# Patient Record
Sex: FEMALE | Race: WHITE | NOT HISPANIC OR LATINO | Employment: OTHER | RURAL
[De-identification: names, ages, dates, MRNs, and addresses within clinical notes are randomized per-mention and may not be internally consistent; named-entity substitution may affect disease eponyms.]

---

## 2020-09-30 ENCOUNTER — HISTORICAL (OUTPATIENT)
Dept: ADMINISTRATIVE | Facility: HOSPITAL | Age: 75
End: 2020-09-30

## 2021-03-31 ENCOUNTER — HOSPITAL ENCOUNTER (OUTPATIENT)
Dept: RADIOLOGY | Facility: HOSPITAL | Age: 76
Discharge: HOME | End: 2021-03-31
Payer: MEDICARE

## 2021-03-31 VITALS — WEIGHT: 168 LBS | BODY MASS INDEX: 29.77 KG/M2 | HEIGHT: 63 IN

## 2021-03-31 DIAGNOSIS — Z09 FOLLOW-UP EXAM, 3-6 MONTHS SINCE PREVIOUS EXAM: ICD-10-CM

## 2021-03-31 DIAGNOSIS — Z85.3 HX OF BREAST CANCER: ICD-10-CM

## 2021-03-31 PROCEDURE — 77066 DX MAMMO INCL CAD BI: CPT | Mod: 26,,, | Performed by: RADIOLOGY

## 2021-03-31 PROCEDURE — 77066 DX MAMMO INCL CAD BI: CPT | Mod: TC

## 2021-03-31 PROCEDURE — 77066 MAMMO DIGITAL DIAGNOSTIC BILAT: ICD-10-PCS | Mod: 26,,, | Performed by: RADIOLOGY

## 2021-03-31 PROCEDURE — 77065 DX MAMMO INCL CAD UNI: CPT | Mod: TC,RT

## 2021-12-15 ENCOUNTER — HOSPITAL ENCOUNTER (OUTPATIENT)
Dept: RADIOLOGY | Facility: HOSPITAL | Age: 76
Discharge: HOME | End: 2021-12-15
Attending: RADIOLOGY
Payer: MEDICARE

## 2021-12-15 DIAGNOSIS — Z85.3 HX OF BREAST CANCER: ICD-10-CM

## 2021-12-15 PROCEDURE — 77065 DX MAMMO INCL CAD UNI: CPT | Mod: 26,RT,, | Performed by: RADIOLOGY

## 2021-12-15 PROCEDURE — 77065 MAMMO DIGITAL DIAGNOSTIC RIGHT: ICD-10-PCS | Mod: 26,RT,, | Performed by: RADIOLOGY

## 2021-12-15 PROCEDURE — 77065 DX MAMMO INCL CAD UNI: CPT | Mod: TC,RT

## 2022-07-05 ENCOUNTER — HOSPITAL ENCOUNTER (OUTPATIENT)
Dept: RADIOLOGY | Facility: HOSPITAL | Age: 77
Discharge: HOME | End: 2022-07-05
Payer: MEDICARE

## 2022-07-05 DIAGNOSIS — Z12.31 VISIT FOR SCREENING MAMMOGRAM: ICD-10-CM

## 2022-07-05 PROCEDURE — 77067 MAMMO DIGITAL SCREENING BILAT: ICD-10-PCS | Mod: 26,,, | Performed by: RADIOLOGY

## 2022-07-05 PROCEDURE — 77067 SCR MAMMO BI INCL CAD: CPT | Mod: TC

## 2022-07-05 PROCEDURE — 77067 SCR MAMMO BI INCL CAD: CPT | Mod: 26,,, | Performed by: RADIOLOGY

## 2022-12-16 ENCOUNTER — HOSPITAL ENCOUNTER (OUTPATIENT)
Dept: RADIOLOGY | Facility: HOSPITAL | Age: 77
Discharge: HOME | End: 2022-12-16
Attending: RADIOLOGY
Payer: MEDICARE

## 2022-12-16 DIAGNOSIS — Z85.3 HX OF BREAST CANCER: ICD-10-CM

## 2022-12-16 PROCEDURE — 77065 DX MAMMO INCL CAD UNI: CPT | Mod: TC,RT

## 2022-12-16 PROCEDURE — 77065 MAMMO DIGITAL DIAGNOSTIC RIGHT: ICD-10-PCS | Mod: 26,RT,, | Performed by: RADIOLOGY

## 2022-12-16 PROCEDURE — 77065 DX MAMMO INCL CAD UNI: CPT | Mod: 26,RT,, | Performed by: RADIOLOGY

## 2023-02-08 ENCOUNTER — OFFICE VISIT (OUTPATIENT)
Dept: DERMATOLOGY | Facility: CLINIC | Age: 78
End: 2023-02-08
Payer: MEDICARE

## 2023-02-08 VITALS — WEIGHT: 168 LBS | BODY MASS INDEX: 29.77 KG/M2 | HEIGHT: 63 IN

## 2023-02-08 DIAGNOSIS — L57.8 OTHER SKIN CHANGES DUE TO CHRONIC EXPOSURE TO NONIONIZING RADIATION: Primary | ICD-10-CM

## 2023-02-08 DIAGNOSIS — L21.9 SEBORRHEIC DERMATITIS: ICD-10-CM

## 2023-02-08 DIAGNOSIS — L82.1 SEBORRHEIC KERATOSES: ICD-10-CM

## 2023-02-08 DIAGNOSIS — B35.1 ONYCHOMYCOSIS: ICD-10-CM

## 2023-02-08 DIAGNOSIS — Z85.820 HISTORY OF MELANOMA: ICD-10-CM

## 2023-02-08 DIAGNOSIS — Z80.8 FAMILY HISTORY OF MELANOMA: ICD-10-CM

## 2023-02-08 DIAGNOSIS — Z85.828 HISTORY OF NONMELANOMA SKIN CANCER: ICD-10-CM

## 2023-02-08 PROCEDURE — 99203 PR OFFICE/OUTPT VISIT, NEW, LEVL III, 30-44 MIN: ICD-10-PCS | Mod: ,,, | Performed by: DERMATOLOGY

## 2023-02-08 PROCEDURE — 99203 OFFICE O/P NEW LOW 30 MIN: CPT | Mod: ,,, | Performed by: DERMATOLOGY

## 2023-02-08 RX ORDER — CHOLECALCIFEROL (VITAMIN D3) 25 MCG
1000 TABLET ORAL DAILY
COMMUNITY

## 2023-02-08 RX ORDER — HYDROCHLOROTHIAZIDE 12.5 MG/1
12.5 TABLET ORAL DAILY
COMMUNITY

## 2023-02-08 RX ORDER — ASPIRIN 81 MG/1
81 TABLET ORAL DAILY
COMMUNITY

## 2023-02-08 RX ORDER — CALCIUM CARBONATE 600 MG
600 TABLET ORAL ONCE
COMMUNITY

## 2023-02-08 RX ORDER — CARTILAGE/COLLAGEN/BOR/HYALUR 40-10-5 MG
TABLET ORAL
COMMUNITY

## 2023-02-08 RX ORDER — MULTIVITAMIN
1 TABLET ORAL DAILY
COMMUNITY

## 2023-02-08 RX ORDER — KETOCONAZOLE 20 MG/G
CREAM TOPICAL DAILY
Qty: 60 G | Refills: 11 | Status: SHIPPED | OUTPATIENT
Start: 2023-02-08

## 2023-02-08 RX ORDER — NEBIVOLOL 10 MG/1
10 TABLET ORAL DAILY
COMMUNITY

## 2023-02-08 RX ORDER — KETOCONAZOLE 20 MG/ML
SHAMPOO, SUSPENSION TOPICAL
Qty: 120 ML | Refills: 11 | Status: SHIPPED | OUTPATIENT
Start: 2023-02-08

## 2023-02-08 RX ORDER — ALIROCUMAB 75 MG/ML
INJECTION, SOLUTION SUBCUTANEOUS
COMMUNITY

## 2023-02-08 RX ORDER — NITROGLYCERIN 0.4 MG/1
0.4 TABLET SUBLINGUAL EVERY 5 MIN PRN
COMMUNITY

## 2023-02-08 RX ORDER — FERROUS SULFATE 325(65) MG
325 TABLET, DELAYED RELEASE (ENTERIC COATED) ORAL
COMMUNITY

## 2023-02-08 RX ORDER — ATORVASTATIN CALCIUM 80 MG/1
80 TABLET, FILM COATED ORAL DAILY
COMMUNITY

## 2023-02-08 RX ORDER — ASCORBIC ACID 500 MG
500 TABLET ORAL DAILY
COMMUNITY

## 2023-02-08 RX ORDER — ESOMEPRAZOLE MAGNESIUM 40 MG/1
40 CAPSULE, DELAYED RELEASE ORAL
COMMUNITY

## 2023-02-08 NOTE — PROGRESS NOTES
[facility]   Valerie Ramirez MD    Patient Name: Rama Mathews  Patient YOB: 1945   Date of Service: 2/8/23    CC: Full Skin Exam    HPI: Rama Mathews is a 77 y.o. female presents today for a full skin exam.  Patient is new and dermatologic history includes melanoma. Patient has no concerns at present.    Past Medical History:   Diagnosis Date    Breast cancer     Usual hyperplasia of lactiferous duct      Past Surgical History:   Procedure Laterality Date    BREAST LUMPECTOMY      HYSTERECTOMY      OOPHORECTOMY       Review of patient's allergies indicates:   Allergen Reactions    Opioids - morphine analogues        Current Outpatient Medications:     alirocumab (PRALUENT PEN) 75 mg/mL PnIj, Inject into the skin., Disp: , Rfl:     ascorbic acid, vitamin C, (VITAMIN C) 500 MG tablet, Take 500 mg by mouth once daily., Disp: , Rfl:     aspirin (ECOTRIN) 81 MG EC tablet, Take 81 mg by mouth once daily., Disp: , Rfl:     atorvastatin (LIPITOR) 80 MG tablet, Take 80 mg by mouth once daily., Disp: , Rfl:     calcium carbonate (OS-RADHA) 600 mg calcium (1,500 mg) Tab, Take 600 mg by mouth once., Disp: , Rfl:     cyanocobalamin, vitamin B-12, (VITAMIN B-12) 500 mcg Lozg, Take by mouth., Disp: , Rfl:     esomeprazole (NEXIUM) 40 MG capsule, Take 40 mg by mouth before breakfast., Disp: , Rfl:     FAMOTIDINE ORAL, Take by mouth., Disp: , Rfl:     ferrous sulfate 325 (65 FE) MG EC tablet, Take 325 mg by mouth 3 (three) times daily with meals., Disp: , Rfl:     hydroCHLOROthiazide (HYDRODIURIL) 12.5 MG Tab, Take 12.5 mg by mouth once daily., Disp: , Rfl:     magnesium oxide (MAG-OXIDE ORAL), Take 800 mg by mouth 2 (two) times a day., Disp: , Rfl:     multivitamin (ONE DAILY MULTIVITAMIN) per tablet, Take 1 tablet by mouth once daily., Disp: , Rfl:     nebivoloL (BYSTOLIC) 10 MG Tab, Take 10 mg by mouth once daily., Disp: , Rfl:     nitroGLYCERIN (NITROSTAT) 0.4 MG SL tablet, Place 0.4 mg under the  tongue every 5 (five) minutes as needed for Chest pain., Disp: , Rfl:     SITagliptan-metformin (JANUMET) 50-1,000 mg per tablet, Take 1 tablet by mouth 2 (two) times daily with meals., Disp: , Rfl:     vitamin D (VITAMIN D3) 1000 units Tab, Take 1,000 Units by mouth once daily., Disp: , Rfl:     ketoconazole (NIZORAL) 2 % cream, Apply topically once daily., Disp: 60 g, Rfl: 11    ketoconazole (NIZORAL) 2 % shampoo, Use as a scalp treatment 2-3 times a week for maintenance, massaging into scalp and leaving on for up to 3 minutes before rinsing, Disp: 120 mL, Rfl: 11    ROS: A focused review of systems was obtained and negative.     Exam: A full skin exam was performed including scalp, hair, face, neck, chest, back, abdomen, right arm, left arm, right hand, left hand, nails, right leg, and left leg.  All areas examined were normal expect as per below in assessment and plan.  General Appearance of the patient is well developed and well nourished.  Orientation: alert and oriented x 3.  Mood and affect: pleasant.    Assessment:   The primary encounter diagnosis was Other skin changes due to chronic exposure to nonionizing radiation. Diagnoses of AK (actinic keratosis), Seborrheic keratoses, Family history of melanoma, History of melanoma, History of nonmelanoma skin cancer, Onychomycosis, and Seborrheic dermatitis were also pertinent to this visit.    Plan:   Medications Ordered This Encounter   Medications    ketoconazole (NIZORAL) 2 % cream     Sig: Apply topically once daily.     Dispense:  60 g     Refill:  11    ketoconazole (NIZORAL) 2 % shampoo     Sig: Use as a scalp treatment 2-3 times a week for maintenance, massaging into scalp and leaving on for up to 3 minutes before rinsing     Dispense:  120 mL     Refill:  11       Seborrheic Keratosis (L82.1)  - Stuck-on, warty, greasy brown papule with pseudo-horn cysts scattered on the trunk and extremities    Plan: Counseling.  I counseled the patient regarding the  following:  Skin Care: Seborrheic Keratoses are benign. No treatment is necessary.  Expectations: Seborrheic Keratoses are benign warty growths. Patients get more of them as they age    Plan: Reassurance    History of non-melanoma skin cancer (Z85.828)  - Well healed scar with NER  Associated diagnosis: Medical surveillance following completed treatment    Plan: Monitoring.    History of malignant melanoma  - well healed scar with NER  Associated diagnosis: Medical surveillance following completed treatment    Plan: Counseling  I counseled the patient regarding the following:  Skin Care: Patients with a history of melanoma should wear broad spectrum sunscreen and sun protective clothing.  Expectations: Scars from excisional sites of melanoma should be monitored for any recurrences. Monthly self-skin checks should be performed to monitor for any moles that change in size, shape or color, itch burn or bleed.  Contact Office if: Patient notices any new or changing moles, develops constitutional symptoms or develops new lesions within or around the previous melanoma scar.    Family history of malignant melanoma  - no suspicious lesions noted    Plan: Counseling  I counseled the patient regarding the following:  Skin Care: Patients with a family history of melanoma should wear broad spectrum sunscreen and sun protective clothing.  Expectations: Patients with a family history of melanoma from a 1st degree relative have a higher risk of developing a melanoma compared with the rest of the population. Monthly self-skin checks should be performed to monitor for any moles that change in size, shape or color, itch burn or bleed.  Contact Office if: Patient notices any new or changing moles.    Onychomycosis (B35.1)  - discolored nails with onycholysis and subungual debris    Plan: Counseling  I counseled the patient regarding the following:  Skin care: Onychomycosis rarely responds to prolonged use of topical anti-fungal  agents. Oral antifungal agents offer a higher cure rate, but relapses occur in 50% of patients.  Expectations: Onychomycosis is a fungal infection of the nail plate. Oral therapy is more effective than topical therapy, but serious side effects such as liver toxicity, bone marrow depression and severe rashes may ensue with systemic treatment.  Contact office if: Patient develops a side effect from treatment.    - pt declines lamisil   - Begin keto cream for tinea pedis prevention     Other Skin Changes Due to Chronic Exposure of Nonionizing Radiation (L57.8)    Plan: Monitoring.     Plan: Sunscreen Recommendations.  I recommended a broad spectrum sunscreen with a SPF of 30 or higher. I explained that SPF 30 sunscreens block approximately 97 percent of the  sun's harmful rays. Sunscreens should be applied at least 15 minutes prior to expected sun exposure and then every 2 hours after that as long as  sun exposure continues. If swimming or exercising sunscreen should be reapplied every 45 minutes to an hour after getting wet or sweating. One  ounce, or the equivalent of a shot glass full of sunscreen, is adequate to protect the skin not covered by a bathing suit. I also recommended a lip  balm with a sunscreen as well. Sun protective clothing can be used in lieu of sunscreen but must be worn the entire time you are exposed to the  sun's rays.    Seborrheic Dermatitis (L21.8)  - Pink/orange scaly plaques located on the scalp    Status: Inadequately controlled     Plan: Counseling.  I counseled the patient regarding the following:  Skin care: Emollients, shampoos with tar, selenium or zinc pyrithione can improve seborrheic dermatitis.  Expectations: Seborrheic Dermatitis is chronic in nature with periods of remissions and flares. Flares can be  triggered by stress.  Contact office if: Seborrheic dermatitis worsens, or fails to improve despite several months of treatment.    Plan: Prescription       Follow up in about 6  months (around 8/8/2023) for FSE.    Valerie Ramirez MD

## 2023-07-12 ENCOUNTER — HOSPITAL ENCOUNTER (OUTPATIENT)
Dept: RADIOLOGY | Facility: HOSPITAL | Age: 78
Discharge: HOME | End: 2023-07-12
Payer: MEDICARE

## 2023-07-12 DIAGNOSIS — Z12.31 VISIT FOR SCREENING MAMMOGRAM: ICD-10-CM

## 2023-07-12 PROCEDURE — 77067 SCR MAMMO BI INCL CAD: CPT | Mod: 26,,, | Performed by: RADIOLOGY

## 2023-07-12 PROCEDURE — 77067 SCR MAMMO BI INCL CAD: CPT | Mod: TC

## 2023-07-12 PROCEDURE — 77067 MAMMO DIGITAL SCREENING BILAT: ICD-10-PCS | Mod: 26,,, | Performed by: RADIOLOGY

## 2023-08-08 ENCOUNTER — OFFICE VISIT (OUTPATIENT)
Dept: DERMATOLOGY | Facility: CLINIC | Age: 78
End: 2023-08-08
Payer: MEDICARE

## 2023-08-08 DIAGNOSIS — L82.1 SK (SEBORRHEIC KERATOSIS): ICD-10-CM

## 2023-08-08 DIAGNOSIS — Z85.828 HISTORY OF NONMELANOMA SKIN CANCER: ICD-10-CM

## 2023-08-08 DIAGNOSIS — Z85.820 HISTORY OF MELANOMA: ICD-10-CM

## 2023-08-08 DIAGNOSIS — L57.0 AK (ACTINIC KERATOSIS): ICD-10-CM

## 2023-08-08 DIAGNOSIS — L57.8 OTHER SKIN CHANGES DUE TO CHRONIC EXPOSURE TO NONIONIZING RADIATION: Primary | ICD-10-CM

## 2023-08-08 PROCEDURE — 99213 PR OFFICE/OUTPT VISIT, EST, LEVL III, 20-29 MIN: ICD-10-PCS | Mod: 25,,, | Performed by: DERMATOLOGY

## 2023-08-08 PROCEDURE — 99213 OFFICE O/P EST LOW 20 MIN: CPT | Mod: 25,,, | Performed by: DERMATOLOGY

## 2023-08-08 PROCEDURE — 17000 PR DESTRUCTION(LASER SURGERY,CRYOSURGERY,CHEMOSURGERY),PREMALIGNANT LESIONS,FIRST LESION: ICD-10-PCS | Mod: ,,, | Performed by: DERMATOLOGY

## 2023-08-08 PROCEDURE — 17000 DESTRUCT PREMALG LESION: CPT | Mod: ,,, | Performed by: DERMATOLOGY

## 2023-08-08 RX ORDER — EZETIMIBE 10 MG/1
10 TABLET ORAL
COMMUNITY
Start: 2023-08-02

## 2023-08-08 RX ORDER — BUDESONIDE 3 MG/1
9 CAPSULE, COATED PELLETS ORAL EVERY MORNING
COMMUNITY
Start: 2023-08-01

## 2023-08-08 NOTE — PROGRESS NOTES
[facility]   Valerie Ramirez MD    Patient Name: Rama Mathews  Patient YOB: 1945   Date of Service: 8/8/23    CC: Full Skin Exam    HPI: Rama Mathews is a 77 y.o. female presents today for a full skin exam.  Patient was last seen 02/08/2023 and dermatologic history includes melanoma and NMSC. Patient is concerned today about a lesion located on the chest.  It has been present for 1 year(s). It has not been treated in the past.  Patient is also concerned about lesions located on the scalp .    Past Medical History:   Diagnosis Date    Breast cancer     Usual hyperplasia of lactiferous duct      Past Surgical History:   Procedure Laterality Date    BREAST LUMPECTOMY      HYSTERECTOMY      OOPHORECTOMY       Review of patient's allergies indicates:   Allergen Reactions    Opioids - morphine analogues        Current Outpatient Medications:     alirocumab (PRALUENT PEN) 75 mg/mL PnIj, Inject into the skin., Disp: , Rfl:     ascorbic acid, vitamin C, (VITAMIN C) 500 MG tablet, Take 500 mg by mouth once daily., Disp: , Rfl:     aspirin (ECOTRIN) 81 MG EC tablet, Take 81 mg by mouth once daily., Disp: , Rfl:     atorvastatin (LIPITOR) 80 MG tablet, Take 80 mg by mouth once daily., Disp: , Rfl:     budesonide (ENTOCORT EC) 3 mg capsule, Take 9 mg by mouth every morning., Disp: , Rfl:     calcium carbonate (OS-RADHA) 600 mg calcium (1,500 mg) Tab, Take 600 mg by mouth once., Disp: , Rfl:     cyanocobalamin, vitamin B-12, (VITAMIN B-12) 500 mcg Lozg, Take by mouth., Disp: , Rfl:     esomeprazole (NEXIUM) 40 MG capsule, Take 40 mg by mouth before breakfast., Disp: , Rfl:     ezetimibe (ZETIA) 10 mg tablet, Take 10 mg by mouth., Disp: , Rfl:     FAMOTIDINE ORAL, Take by mouth., Disp: , Rfl:     ferrous sulfate 325 (65 FE) MG EC tablet, Take 325 mg by mouth 3 (three) times daily with meals., Disp: , Rfl:     hydroCHLOROthiazide (HYDRODIURIL) 12.5 MG Tab, Take 12.5 mg by mouth once daily., Disp: , Rfl:      ketoconazole (NIZORAL) 2 % cream, Apply topically once daily., Disp: 60 g, Rfl: 11    ketoconazole (NIZORAL) 2 % shampoo, Use as a scalp treatment 2-3 times a week for maintenance, massaging into scalp and leaving on for up to 3 minutes before rinsing, Disp: 120 mL, Rfl: 11    magnesium oxide (MAG-OXIDE ORAL), Take 800 mg by mouth 2 (two) times a day., Disp: , Rfl:     multivitamin (ONE DAILY MULTIVITAMIN) per tablet, Take 1 tablet by mouth once daily., Disp: , Rfl:     nebivoloL (BYSTOLIC) 10 MG Tab, Take 10 mg by mouth once daily., Disp: , Rfl:     nitroGLYCERIN (NITROSTAT) 0.4 MG SL tablet, Place 0.4 mg under the tongue every 5 (five) minutes as needed for Chest pain., Disp: , Rfl:     SITagliptan-metformin (JANUMET) 50-1,000 mg per tablet, Take 1 tablet by mouth 2 (two) times daily with meals., Disp: , Rfl:     vitamin D (VITAMIN D3) 1000 units Tab, Take 1,000 Units by mouth once daily., Disp: , Rfl:     ROS: A focused review of systems was obtained and negative.     Exam: A full skin exam was performed including scalp, hair, face, neck, chest, back, abdomen, right arm, left arm, right hand, left hand, nails, right leg, and left leg.  All areas examined were normal expect as per below in assessment and plan.  General Appearance of the patient is well developed and well nourished.  Orientation: alert and oriented x 3.  Mood and affect: pleasant.    Assessment:   The primary encounter diagnosis was Other skin changes due to chronic exposure to nonionizing radiation. Diagnoses of History of melanoma, History of nonmelanoma skin cancer, SK (seborrheic keratosis), and AK (actinic keratosis) were also pertinent to this visit.    Plan:      Other Skin Changes Due to Chronic Exposure of Nonionizing Radiation (L57.8)    Plan: Monitoring.     Plan: Sunscreen Recommendations.  I recommended a broad spectrum sunscreen with a SPF of 30 or higher. I explained that SPF 30 sunscreens block approximately 97 percent of  the  sun's harmful rays. Sunscreens should be applied at least 15 minutes prior to expected sun exposure and then every 2 hours after that as long as  sun exposure continues. If swimming or exercising sunscreen should be reapplied every 45 minutes to an hour after getting wet or sweating. One  ounce, or the equivalent of a shot glass full of sunscreen, is adequate to protect the skin not covered by a bathing suit. I also recommended a lip  balm with a sunscreen as well. Sun protective clothing can be used in lieu of sunscreen but must be worn the entire time you are exposed to the  sun's rays.    Seborrheic Keratosis (L82.1)  - Stuck-on, warty, greasy brown papule with pseudo-horn cysts scattered on the trunk and extremities    Plan: Counseling.  I counseled the patient regarding the following:  Skin Care: Seborrheic Keratoses are benign. No treatment is necessary.  Expectations: Seborrheic Keratoses are benign warty growths. Patients get more of them as they age    Plan: Reassurance    Actinic Keratoses(L57.0)  - Erythematous patches and papules with hyperkeratotic scale distributed on the scalp.    Plan: Counseling.  I counseled the patient regarding the following:  Skin Care: Sun protective clothing and broad spectrum sunscreen can prevent the formation of Actinic  Keratoses. AKs can resolve with cryotherapy, photodynamic therapy, imiquimod, topical 5-FU.  Expectations: Actinic Keratoses are precancerous proliferations that occur within sun damaged skin. If untreated,  a small subset of AKs can develop into Squamous Cell Carcinoma.  Contact Office if: If AKs fail to resolve despite treatment, or if you develop a side effect from therapy, such as  unbearable crusting, scabbing, redness and tenderness.    Plan: Liquid Nitrogen.  A total of 1 lesions were treated with liquid nitrogen for 2 freeze-thaw cycles lasting 5 seconds, located on the above locations.   The patient's consent was obtained including but not  limited to risks of crusting, scabbing,  blistering, scarring, darker or lighter pigmentary change, recurrence, incomplete removal and infection.    History of malignant melanoma  - well healed scar with NER  Associated diagnosis: Medical surveillance following completed treatment    Plan: Counseling  I counseled the patient regarding the following:  Skin Care: Patients with a history of melanoma should wear broad spectrum sunscreen and sun protective clothing.  Expectations: Scars from excisional sites of melanoma should be monitored for any recurrences. Monthly self-skin checks should be performed to monitor for any moles that change in size, shape or color, itch burn or bleed.  Contact Office if: Patient notices any new or changing moles, develops constitutional symptoms or develops new lesions within or around the previous melanoma scar.      History of non-melanoma skin cancer (Z85.828)  - Well healed scar with NER  Associated diagnosis: Medical surveillance following completed treatment    Plan: Monitoring.      Follow up in about 6 months (around 2/8/2024) for fse.    Valerie Ramirez MD

## 2023-09-26 DIAGNOSIS — M76.72 PERONEAL TENDINITIS, LEFT LEG: Primary | ICD-10-CM

## 2023-10-03 ENCOUNTER — CLINICAL SUPPORT (OUTPATIENT)
Dept: REHABILITATION | Facility: HOSPITAL | Age: 78
End: 2023-10-03
Payer: MEDICARE

## 2023-10-03 DIAGNOSIS — M76.72 PERONEAL TENDINITIS, LEFT LEG: ICD-10-CM

## 2023-10-03 PROCEDURE — 97161 PT EVAL LOW COMPLEX 20 MIN: CPT | Mod: PN

## 2023-10-03 PROCEDURE — 97110 THERAPEUTIC EXERCISES: CPT | Mod: PN

## 2023-10-03 NOTE — PLAN OF CARE
[facility]   Physical Therapy Initial Evaluation    Date: 10/3/2023   Name: Finesse Mathews  Clinic Number: [id number]    Therapy Diagnosis:   Encounter Diagnosis   Name Primary?    Peroneal tendinitis, left leg      Physician: Saroj Whaley NP    Physician Orders: PT Eval and Treat    Medical Diagnosis from Referral: left peroneal tendonitis   Evaluation Date: 10/3/2023  Updated Plan of Care Due : 11/03/2023   Authorization Period Expiration: end of year   Plan of Care Expiration: end of year   Visit # / Visits authorized: 1/ 20    Time In:  1400   Time Out: 1445   Total Appointment Time (timed & untimed codes): 45 minutes    Precautions: Standard    Subjective   Date of onset: pt states she has been having bilataral ankle pain on lateral side for months. Pt voices the pain has subsided after last ortho visit.  Pt here today for home ex program   History of current condition - FINESSE reports: hx below      Medical History:   Past Medical History:   Diagnosis Date    Breast cancer     Usual hyperplasia of lactiferous duct        Surgical History:   Finesse Mathews  has a past surgical history that includes Breast lumpectomy; Hysterectomy; and Oophorectomy.    Medications:   Finesse has a current medication list which includes the following prescription(s): praluent pen, ascorbic acid (vitamin c), aspirin, atorvastatin, budesonide, calcium carbonate, cyanocobalamin (vitamin b-12), esomeprazole, ezetimibe, famotidine, ferrous sulfate, hydrochlorothiazide, ketoconazole, ketoconazole, magnesium oxide, multivitamin, nebivolol, nitroglycerin, sitagliptan-metformin, and vitamin d.    Allergies:   Review of patient's allergies indicates:   Allergen Reactions    Opioids - morphine analogues         Imaging, bone scan films:      Prior Therapy:  none   Social History:   lives with their spouse  Occupation: retired        Pain:  Current 0/10, worst 4/10, best 0/10   Location: left ankles generalized    Description: Aching, Dull, Burning, Tight, and Deep  Aggravating Factors: Standing and Walking  Easing Factors: rest    Patients goals: be able to walk pain free     Objective                   Range of Motion/Strength :                  Left Extremity                                                                        Right Extremity   AROM PROM Strength  Location  AROM    PROM   Strength        Hip      Flexion                       Extension                       Internal Rotation (Prone)                       External Rotation (Prone)                       Abduction                       Adduction       130  5   Knee    Flexion 130  5   0  5                Extension 0  5   3  3+   Ankle   Dorsiflexion 10  3+   40  3+                Plantar Flexion 50  3+   25  3+                Inversion 20  3+   8  3+                Eversion 20  3+                  Functional Impairments :  decreased range of motion and strength       Limitation/Restriction for FOTO ankle  Survey    Therapist reviewed FOTO scores for Finesse Mathews on 10/3/2023.   FOTO documents entered into EPIC - see Media section.    Limitation Score: 55%         TREATMENT          FINESSE received the treatments listed below:  THERAPEUTIC EXERCISES to develop strength, endurance, ROM, flexibility, and posture for 20   minutes including home ex program         Home Exercises and Patient Education Provided    Education provided:   - Pt performed and received home ex program.     Written Home Exercises Provided: Patient instructed to cont prior HEP.  Exercises were reviewed and FINESSE was able to demonstrate them prior to the end of the session.  FINESSE demonstrated good  understanding of the education provided.     See EMR under Patient Instructions for exercises provided 10/3/2023.    Assessment   Finesse is a 78 y.o. female referred to outpatient Physical Therapy with a medical diagnosis of left peroneal tendonitis . Patient presents with  decreased range of motion and strength .     Patient prognosis is Excellent.   Patientt will benefit from skilled outpatient Physical Therapy to address the deficits stated above and in the chart below, provide patient /family education, and to maximize patientt's level of independence.     Plan of care discussed with patient: Yes  Patient's spiritual, cultural and educational needs considered and patient is agreeable to the plan of care and goals as stated below:     Anticipated Barriers for therapy:  left peroneal tendonitis . Patient presents with decreased range of motion and strength .     Goals:  Short Term Goals: 1 visit   Pt will be independent with home ex program        Long Term Goals: 4 weeks   Be able to shop one hour pain free     Plan   Plan of care Certification: 10/3/2023  .    Outpatient Physical Therapy 1 times  visit : Neuromuscular Re-ed, Patient Education, and Therapeutic Exercise.     Rylan Mak, PT           I CERTIFY THE NEED FOR THESE SERVICES FURNISHED UNDER THIS PLAN OF TREATMENT AND WHILE UNDER MY CARE.    Physician's comments:      Physician's Signature: ___________________________________________________

## 2024-01-31 ENCOUNTER — HOSPITAL ENCOUNTER (OUTPATIENT)
Dept: RADIOLOGY | Facility: HOSPITAL | Age: 79
Discharge: HOME | End: 2024-01-31
Attending: RADIOLOGY
Payer: MEDICARE

## 2024-01-31 DIAGNOSIS — R92.8 ABNORMAL MAMMOGRAM: ICD-10-CM

## 2024-01-31 DIAGNOSIS — Z85.3 HISTORY OF CANCER OF RIGHT BREAST: ICD-10-CM

## 2024-01-31 PROCEDURE — 77061 BREAST TOMOSYNTHESIS UNI: CPT | Mod: TC,RT

## 2024-01-31 PROCEDURE — 77065 DX MAMMO INCL CAD UNI: CPT | Mod: 26,RT,, | Performed by: RADIOLOGY

## 2024-01-31 PROCEDURE — 77061 BREAST TOMOSYNTHESIS UNI: CPT | Mod: 26,RT,, | Performed by: RADIOLOGY

## 2024-01-31 PROCEDURE — 77065 DX MAMMO INCL CAD UNI: CPT | Mod: TC,RT

## 2024-02-08 ENCOUNTER — OFFICE VISIT (OUTPATIENT)
Dept: DERMATOLOGY | Facility: CLINIC | Age: 79
End: 2024-02-08
Payer: MEDICARE

## 2024-02-08 VITALS — RESPIRATION RATE: 18 BRPM | WEIGHT: 167 LBS | HEIGHT: 63 IN | BODY MASS INDEX: 29.59 KG/M2

## 2024-02-08 DIAGNOSIS — L57.8 OTHER SKIN CHANGES DUE TO CHRONIC EXPOSURE TO NONIONIZING RADIATION: Primary | ICD-10-CM

## 2024-02-08 DIAGNOSIS — L82.1 SEBORRHEIC KERATOSES: ICD-10-CM

## 2024-02-08 DIAGNOSIS — Z85.820 HISTORY OF MELANOMA: ICD-10-CM

## 2024-02-08 DIAGNOSIS — B35.3 TINEA PEDIS OF BOTH FEET: ICD-10-CM

## 2024-02-08 DIAGNOSIS — D22.9 BENIGN NEVUS OF SKIN: ICD-10-CM

## 2024-02-08 DIAGNOSIS — Z85.828 HISTORY OF NONMELANOMA SKIN CANCER: ICD-10-CM

## 2024-02-08 PROCEDURE — 99214 OFFICE O/P EST MOD 30 MIN: CPT | Mod: ,,, | Performed by: DERMATOLOGY

## 2024-02-08 RX ORDER — EVOLOCUMAB 140 MG/ML
140 INJECTION, SOLUTION SUBCUTANEOUS
COMMUNITY
Start: 2024-02-01

## 2024-02-08 RX ORDER — DENOSUMAB 60 MG/ML
INJECTION SUBCUTANEOUS
COMMUNITY
Start: 2023-04-05

## 2024-02-08 RX ORDER — KETOCONAZOLE 20 MG/G
CREAM TOPICAL
Qty: 60 G | Refills: 11 | Status: SHIPPED | OUTPATIENT
Start: 2024-02-08

## 2024-02-08 RX ORDER — PIOGLITAZONEHYDROCHLORIDE 15 MG/1
TABLET ORAL
COMMUNITY

## 2024-02-08 NOTE — PROGRESS NOTES
[facility]   Valerie Ramirez MD    Patient Name: Rama Mathews  Patient YOB: 1945   Date of Service: 2/8/24    CC: Full Skin Exam    HPI: Rama Mathews is a 78 y.o. female presents today for a full skin exam.  Patient was last seen 8/8/23 and dermatologic history includes melanoma x2 and NMSC. Patient has no concerns on today's exam.     Past Medical History:   Diagnosis Date    Breast cancer     Usual hyperplasia of lactiferous duct      Past Surgical History:   Procedure Laterality Date    BREAST LUMPECTOMY      HYSTERECTOMY      OOPHORECTOMY       Review of patient's allergies indicates:   Allergen Reactions    Opioids - morphine analogues        Current Outpatient Medications:     denosumab (PROLIA) 60 mg/mL Syrg, 60 mg Subcutaneous every 6 months for 1 days, Disp: , Rfl:     REPATHA SURECLICK 140 mg/mL PnIj, Inject 140 mg into the skin every 14 (fourteen) days., Disp: , Rfl:     alirocumab (PRALUENT PEN) 75 mg/mL PnIj, Inject into the skin., Disp: , Rfl:     ascorbic acid, vitamin C, (VITAMIN C) 500 MG tablet, Take 500 mg by mouth once daily., Disp: , Rfl:     aspirin (ECOTRIN) 81 MG EC tablet, Take 81 mg by mouth once daily., Disp: , Rfl:     atorvastatin (LIPITOR) 80 MG tablet, Take 80 mg by mouth once daily., Disp: , Rfl:     budesonide (ENTOCORT EC) 3 mg capsule, Take 9 mg by mouth every morning., Disp: , Rfl:     calcium carbonate (OS-RADHA) 600 mg calcium (1,500 mg) Tab, Take 600 mg by mouth once., Disp: , Rfl:     cyanocobalamin, vitamin B-12, (VITAMIN B-12) 500 mcg Lozg, Take by mouth., Disp: , Rfl:     esomeprazole (NEXIUM) 40 MG capsule, Take 40 mg by mouth before breakfast., Disp: , Rfl:     ezetimibe (ZETIA) 10 mg tablet, Take 10 mg by mouth., Disp: , Rfl:     FAMOTIDINE ORAL, Take by mouth., Disp: , Rfl:     ferrous sulfate 325 (65 FE) MG EC tablet, Take 325 mg by mouth 3 (three) times daily with meals., Disp: , Rfl:     hydroCHLOROthiazide (HYDRODIURIL) 12.5 MG Tab, Take  12.5 mg by mouth once daily., Disp: , Rfl:     ketoconazole (NIZORAL) 2 % cream, Apply topically once daily., Disp: 60 g, Rfl: 11    ketoconazole (NIZORAL) 2 % cream, Apply to rash on feet BID, tapering with improvement, Disp: 60 g, Rfl: 11    ketoconazole (NIZORAL) 2 % shampoo, Use as a scalp treatment 2-3 times a week for maintenance, massaging into scalp and leaving on for up to 3 minutes before rinsing, Disp: 120 mL, Rfl: 11    magnesium oxide (MAG-OXIDE ORAL), Take 800 mg by mouth 2 (two) times a day., Disp: , Rfl:     multivitamin (ONE DAILY MULTIVITAMIN) per tablet, Take 1 tablet by mouth once daily., Disp: , Rfl:     nebivoloL (BYSTOLIC) 10 MG Tab, Take 10 mg by mouth once daily., Disp: , Rfl:     nitroGLYCERIN (NITROSTAT) 0.4 MG SL tablet, Place 0.4 mg under the tongue every 5 (five) minutes as needed for Chest pain., Disp: , Rfl:     pioglitazone (ACTOS) 15 MG tablet, , Disp: , Rfl:     SITagliptan-metformin (JANUMET) 50-1,000 mg per tablet, Take 1 tablet by mouth 2 (two) times daily with meals., Disp: , Rfl:     vitamin D (VITAMIN D3) 1000 units Tab, Take 1,000 Units by mouth once daily., Disp: , Rfl:     ROS: A focused review of systems was obtained and negative.     Exam: A full skin exam was performed including scalp, hair, face, neck, chest, back, abdomen, right arm, left arm, right hand, left hand, nails, right leg, and left leg.  All areas examined were normal expect as per below in assessment and plan.  General Appearance of the patient is well developed and well nourished.  Orientation: alert and oriented x 3.  Mood and affect: pleasant.    Assessment:   The primary encounter diagnosis was Other skin changes due to chronic exposure to nonionizing radiation. Diagnoses of Seborrheic keratoses, History of nonmelanoma skin cancer, History of melanoma, Benign nevus of skin, and Tinea pedis of both feet were also pertinent to this visit.    Plan:   Medications Ordered This Encounter   Medications     ketoconazole (NIZORAL) 2 % cream     Sig: Apply to rash on feet BID, tapering with improvement     Dispense:  60 g     Refill:  11     Seborrheic Keratosis (L82.1)  - Stuck-on, warty, greasy brown papule with pseudo-horn cysts scattered on the trunk and extremities    Plan: Counseling.  I counseled the patient regarding the following:  Skin Care: Seborrheic Keratoses are benign. No treatment is necessary.  Expectations: Seborrheic Keratoses are benign warty growths. Patients get more of them as they age    Plan: Reassurance    Benign Nevus (D22.72)  - Dome shaped regular papule    Plan: Reassurance.    Plan: Counseling.  I counseled the patient regarding the following:  Instructions: Monthly self-skin checks to monitor for any changes in moles are recommended.  Expectations: Benign Nevi are pigmented nests of cells within the skin. No treatment is necessary.  Contact Office if: Any moles change in size, shape or color; itch, burn or bleed.  Tinea Pedis   - erythematous scaly plaques in moccasin-like distribution and maceration located on the 1st webspace left foot.    Plan: Counseling  I counseled the patient regarding the following:  Skin care: Most forms of Tinea Pedis can be treated with topical antifungal creams.  Expectations: Tinea Pedis is a dermatophyte infection of the feet. Risk factors include pets, humid or warm climates. Cure rates are excellent, but recurrence is high.  - Will send in ketoconazole cream    History of non-melanoma skin cancer (Z85.828)  - Well healed scar with NER  Associated diagnosis: Medical surveillance following completed treatment    Plan: Monitoring.    History of malignant melanoma  - well healed scar with NER  Associated diagnosis: Medical surveillance following completed treatment    Plan: Counseling  I counseled the patient regarding the following:  Skin Care: Patients with a history of melanoma should wear broad spectrum sunscreen and sun protective clothing.  Expectations:  Scars from excisional sites of melanoma should be monitored for any recurrences. Monthly self-skin checks should be performed to monitor for any moles that change in size, shape or color, itch burn or bleed.  Contact Office if: Patient notices any new or changing moles, develops constitutional symptoms or develops new lesions within or around the previous melanoma scar.  - First melanoma was found 10 years ago  - Second melanoma was in 2022    Other Skin Changes Due to Chronic Exposure of Nonionizing Radiation (L57.8)    Plan: Monitoring.     Plan: Sunscreen Recommendations.  I recommended a broad spectrum sunscreen with a SPF of 30 or higher. I explained that SPF 30 sunscreens block approximately 97 percent of the  sun's harmful rays. Sunscreens should be applied at least 15 minutes prior to expected sun exposure and then every 2 hours after that as long as  sun exposure continues. If swimming or exercising sunscreen should be reapplied every 45 minutes to an hour after getting wet or sweating. One  ounce, or the equivalent of a shot glass full of sunscreen, is adequate to protect the skin not covered by a bathing suit. I also recommended a lip  balm with a sunscreen as well. Sun protective clothing can be used in lieu of sunscreen but must be worn the entire time you are exposed to the  sun's rays.    Tinea Pedis   - erythematous scaly plaques in moccasin-like distribution and maceration located on the 1st webspace left foot.  Status: recurrent and Inadequately controlled     Plan: Counseling  I counseled the patient regarding the following:  Skin care: Most forms of Tinea Pedis can be treated with topical antifungal creams.  Expectations: Tinea Pedis is a dermatophyte infection of the feet. Risk factors include pets, humid or warm climates. Cure rates are excellent, but recurrence is high.    Neoplasm of Uncertain Behavior (D48.5)  - erythematous eroded plaque located on the vertex scalp  Ddx includes: SCC vs AK  vs ISK    - pt reports this was recently biopsied by Dr. Lilly and she has follow up with him for treatment   - will request records     Follow up in about 6 months (around 8/8/2024) for Skin Check .    Valerie Ramirez MD

## 2024-02-08 NOTE — PATIENT INSTRUCTIONS
The ABCDEs of Melanoma  Asymmetry - when one side is unlike the other  Border - irregular  Color - different shades of colors that can be black, brown, tan, white, red, grey, or blue  Diameter - as big as or larger than the size of a pencil eraser (6mm)  Evolving - changing in size, color, or shape or stands out from the rest of your moles  Sunscreen Recommendations  I recommended a broad spectrum sunscreen with a SPF of 30 or higher that is water-resistant. SPF 30 sunscreens block approximately 97 percent of the sun's harmful rays.   Sunscreens should be applied at least 15 minutes prior to expected sun exposure and then every 90 minutes after that as long as sun exposure continues.   If swimming or exercising sunscreen should be reapplied every 45 minutes to an hour after getting wet or sweating.  One ounce, or the equivalent of a shot glass full of sunscreen, is adequate to protect the skin not covered by a bathing suit.   I also recommended a lip balm with a sunscreen as well.   Healthy Sun Protective Behaviors  Sun protective clothing can be used in lieu of sunscreen but must be worn the entire time you are exposed to the sun's rays.  Seek shade between 10 a.m. and 2 p.m.  Use extra caution near water, snow, or sand as they reflect sun rays  Avoid tanning beds and consider sunless self-tanning products instead  Perform monthly self skin exams

## 2024-07-15 ENCOUNTER — HOSPITAL ENCOUNTER (OUTPATIENT)
Dept: RADIOLOGY | Facility: HOSPITAL | Age: 79
Discharge: HOME | End: 2024-07-15
Attending: RADIOLOGY
Payer: MEDICARE

## 2024-07-15 DIAGNOSIS — Z12.31 VISIT FOR SCREENING MAMMOGRAM: ICD-10-CM

## 2024-07-15 PROCEDURE — 77067 SCR MAMMO BI INCL CAD: CPT | Mod: 26,,, | Performed by: RADIOLOGY

## 2024-07-15 PROCEDURE — 77063 BREAST TOMOSYNTHESIS BI: CPT | Mod: TC

## 2024-07-15 PROCEDURE — 77063 BREAST TOMOSYNTHESIS BI: CPT | Mod: 26,,, | Performed by: RADIOLOGY

## 2025-08-25 ENCOUNTER — HOSPITAL ENCOUNTER (OUTPATIENT)
Dept: RADIOLOGY | Facility: HOSPITAL | Age: 80
Discharge: HOME | End: 2025-08-25
Attending: RADIOLOGY
Payer: MEDICARE

## 2025-08-25 DIAGNOSIS — Z12.31 SCREENING MAMMOGRAM FOR BREAST CANCER: ICD-10-CM

## 2025-08-25 PROCEDURE — 77067 SCR MAMMO BI INCL CAD: CPT | Mod: 26,,, | Performed by: RADIOLOGY

## 2025-08-25 PROCEDURE — 77063 BREAST TOMOSYNTHESIS BI: CPT | Mod: TC

## 2025-08-25 PROCEDURE — 77063 BREAST TOMOSYNTHESIS BI: CPT | Mod: 26,,, | Performed by: RADIOLOGY
